# Patient Record
Sex: FEMALE | Race: WHITE | NOT HISPANIC OR LATINO | Employment: UNEMPLOYED | URBAN - METROPOLITAN AREA
[De-identification: names, ages, dates, MRNs, and addresses within clinical notes are randomized per-mention and may not be internally consistent; named-entity substitution may affect disease eponyms.]

---

## 2023-10-04 ENCOUNTER — TREATMENT (OUTPATIENT)
Dept: OCCUPATIONAL THERAPY | Facility: CLINIC | Age: 68
End: 2023-10-04
Payer: COMMERCIAL

## 2023-10-04 DIAGNOSIS — M72.0 DUPUYTRENS CONTRACTURE: ICD-10-CM

## 2023-10-04 PROCEDURE — 97110 THERAPEUTIC EXERCISES: CPT | Mod: GO

## 2023-10-04 PROCEDURE — 97010 HOT OR COLD PACKS THERAPY: CPT | Mod: GO

## 2023-10-04 ASSESSMENT — PAIN - FUNCTIONAL ASSESSMENT: PAIN_FUNCTIONAL_ASSESSMENT: 0-10

## 2023-10-04 ASSESSMENT — PAIN DESCRIPTION - DESCRIPTORS: DESCRIPTORS: ACHING;THROBBING

## 2023-10-04 ASSESSMENT — PAIN SCALES - GENERAL: PAINLEVEL_OUTOF10: 7

## 2023-10-04 NOTE — PROGRESS NOTES
"Occupational Therapy     Occupational Therapy Treatment     Patient Name: Zohreh Davis  MRN: [id number]  Today's Date: 10/4/2023  Time Calculation  Start Time: 1101 (visit 8 of 12)  Stop Time: 1156  Time Calculation (min): 55 min      Current Problem  1. Dupuytrens contracture  OT eval and treat           Subjective  \" I saw the doctor and he said everything looks good. To keep doing what I am doing.        Pain Assessment:  Pain Assessment  Pain Assessment: 0-10  Pain Score: 7  Pain Type: Chronic pain  Pain Location: Hand (L RF and MF)  Pain Orientation: Left  Pain Descriptors: Aching, Throbbing (Pt reports that RF is numb and tendon sticks after flexion)  Pain Frequency: Constant/continuous  Pain Interventions:  (Pt uses heat PRN)     Objective   No edema. Healed incision. Reviewed importance of scar massage to reduce sensitivity     Modalities:  AROM while in fluidotherapy x 10 min to promote movement during session   Ice applied at end of session x 5 min to reduce inflammation and edema  Splinting:  Wearing pre-fran wrist splint at night,.  Therapeutic Exercises:   Therapeutic Exercise  Therapeutic Exercise Performed: Yes  Therapeutic Exercise Activity 1: BTE initiated on this date, 2 full charts at the following resistances, grasp at 15#, 3 point pinch at 10#,large Jar lid horizontally at 4# CCW and 4# CW, jar lid laterally  for finger tips at 2# CW and 2# CCW    Grasp measured today at 60# R and 60#L  Pinch measured today  2 point R at 11.5#/ L at 11#  3 point R at 16#/ L at 14#  Lateral R at 15#/ L at 13#    Sensory   Reports numbness in RF of L hand. Educated pt in re-sensitization tech, returned demo  Outcome Measures : Grasp      OP EDUCATION  Re-sensitization, use of modalities to achieve max function     Follow up doctor visit: Discharged from Doctor, will call if there are any issues    Assessment/Plan   Pt to increase reps of HEP to 15(from 10) of theraputty , iso noodle and tendon glides to reduce " "\"sticking\".      End Pain rating 7/10 , mod fatigue      "

## 2023-10-08 PROBLEM — R06.02 SHORTNESS OF BREATH: Status: ACTIVE | Noted: 2023-10-08

## 2023-10-08 PROBLEM — S39.92XA INJURY OF BACK: Status: ACTIVE | Noted: 2022-08-04

## 2023-10-08 PROBLEM — S16.1XXA STRAIN OF NECK MUSCLE: Status: ACTIVE | Noted: 2022-08-04

## 2023-10-08 PROBLEM — M65.4 TENDINITIS, DE QUERVAIN'S: Status: ACTIVE | Noted: 2023-10-08

## 2023-10-08 PROBLEM — F33.1 MODERATE EPISODE OF RECURRENT MAJOR DEPRESSIVE DISORDER (MULTI): Status: ACTIVE | Noted: 2021-10-19

## 2023-10-08 PROBLEM — F31.62 BIPOLAR DISORDER, CURRENT EPISODE MIXED, MODERATE (MULTI): Status: ACTIVE | Noted: 2023-10-08

## 2023-10-08 PROBLEM — S09.90XA INJURY OF HEAD: Status: ACTIVE | Noted: 2022-08-04

## 2023-10-08 PROBLEM — S40.019A CONTUSION OF SHOULDER: Status: ACTIVE | Noted: 2022-08-04

## 2023-10-08 PROBLEM — R40.1 CLOUDED CONSCIOUSNESS: Status: ACTIVE | Noted: 2022-08-04

## 2023-10-08 PROBLEM — G54.2 CERVICAL NERVE ROOT IMPINGEMENT: Status: ACTIVE | Noted: 2023-10-08

## 2023-10-08 PROBLEM — F51.05 INSOMNIA DUE TO OTHER MENTAL DISORDER: Status: ACTIVE | Noted: 2023-10-08

## 2023-10-08 PROBLEM — I51.81 TAKOTSUBO CARDIOMYOPATHY: Status: ACTIVE | Noted: 2022-08-04

## 2023-10-08 PROBLEM — F99 INSOMNIA DUE TO OTHER MENTAL DISORDER: Status: ACTIVE | Noted: 2023-10-08

## 2023-10-08 PROBLEM — M65.30 TRIGGER FINGER OF LEFT HAND: Status: ACTIVE | Noted: 2023-10-08

## 2023-10-08 PROBLEM — F17.200 NICOTINE USE DISORDER: Status: ACTIVE | Noted: 2022-08-28

## 2023-10-08 PROBLEM — I10 ESSENTIAL HYPERTENSION: Status: ACTIVE | Noted: 2022-08-04

## 2023-10-08 PROBLEM — Z86.79 HISTORY OF HEART FAILURE: Status: ACTIVE | Noted: 2022-08-04

## 2023-10-08 PROBLEM — I50.22 CHRONIC SYSTOLIC (CONGESTIVE) HEART FAILURE (MULTI): Status: ACTIVE | Noted: 2020-11-05

## 2023-10-08 PROBLEM — E27.8 ADRENAL MASS (MULTI): Status: ACTIVE | Noted: 2022-08-04

## 2023-10-08 PROBLEM — F41.9 ANXIETY: Status: ACTIVE | Noted: 2022-08-04

## 2023-10-08 PROBLEM — F41.1 GENERALIZED ANXIETY DISORDER: Status: ACTIVE | Noted: 2023-10-08

## 2023-10-08 PROBLEM — M54.30 SCIATICA: Status: ACTIVE | Noted: 2023-10-08

## 2023-10-08 RX ORDER — PANTOPRAZOLE SODIUM 40 MG/1
40 TABLET, DELAYED RELEASE ORAL DAILY
COMMUNITY
Start: 2023-04-27

## 2023-10-08 RX ORDER — ONDANSETRON 4 MG/1
4 TABLET, ORALLY DISINTEGRATING ORAL EVERY 8 HOURS PRN
COMMUNITY
Start: 2023-09-25

## 2023-10-08 RX ORDER — TRAZODONE HYDROCHLORIDE 50 MG/1
TABLET ORAL NIGHTLY
COMMUNITY

## 2023-10-08 RX ORDER — CARVEDILOL 6.25 MG/1
6.25 TABLET ORAL
COMMUNITY

## 2023-10-08 RX ORDER — POLYETHYLENE GLYCOL-3350 AND ELECTROLYTES 236; 6.74; 5.86; 2.97; 22.74 G/274.31G; G/274.31G; G/274.31G; G/274.31G; G/274.31G
4000 POWDER, FOR SOLUTION ORAL ONCE
COMMUNITY
Start: 2023-02-07

## 2023-10-08 RX ORDER — TEMAZEPAM 15 MG/1
10 CAPSULE ORAL NIGHTLY PRN
COMMUNITY
Start: 2023-07-07

## 2023-10-08 RX ORDER — METHOCARBAMOL 750 MG/1
750 TABLET, FILM COATED ORAL EVERY 6 HOURS PRN
COMMUNITY
Start: 2022-10-24

## 2023-10-08 RX ORDER — IBUPROFEN 200 MG
600 TABLET ORAL AS NEEDED
COMMUNITY

## 2023-10-08 RX ORDER — OXYCODONE AND ACETAMINOPHEN 5; 325 MG/1; MG/1
1 TABLET ORAL EVERY 8 HOURS PRN
COMMUNITY

## 2023-10-08 RX ORDER — PEDI NUTRITION,IRON,LACT-FREE 0.03G-1/ML
1 LIQUID (ML) ORAL DAILY
COMMUNITY
Start: 2023-09-28 | End: 2023-10-28

## 2023-10-08 RX ORDER — METHYLPREDNISOLONE 4 MG/1
TABLET ORAL
COMMUNITY
Start: 2021-01-25

## 2023-10-08 RX ORDER — SODIUM CHLORIDE 0.9 % (FLUSH) 0.9 %
10 SYRINGE (ML) INJECTION
COMMUNITY
Start: 2022-08-03 | End: 2023-11-02

## 2023-10-08 RX ORDER — ERGOCALCIFEROL 1.25 MG/1
50000 CAPSULE ORAL 2 TIMES WEEKLY
COMMUNITY
Start: 2023-07-07

## 2023-10-08 RX ORDER — PREDNISONE 20 MG/1
20 TABLET ORAL DAILY
COMMUNITY
Start: 2022-10-27

## 2023-10-08 RX ORDER — FUROSEMIDE 40 MG/1
40 TABLET ORAL DAILY
COMMUNITY

## 2023-10-08 RX ORDER — HYDROXYZINE HYDROCHLORIDE 25 MG/1
25 TABLET, FILM COATED ORAL NIGHTLY
COMMUNITY
Start: 2023-07-06

## 2023-10-08 RX ORDER — HYDROCODONE BITARTRATE AND ACETAMINOPHEN 5; 325 MG/1; MG/1
1 TABLET ORAL EVERY 6 HOURS PRN
COMMUNITY
Start: 2023-07-31

## 2023-10-08 RX ORDER — FUROSEMIDE 80 MG/1
TABLET ORAL
COMMUNITY

## 2023-10-08 RX ORDER — CIPROFLOXACIN 500 MG/1
500 TABLET ORAL 2 TIMES DAILY
COMMUNITY
Start: 2022-12-01 | End: 2022-12-08

## 2023-10-08 RX ORDER — SACUBITRIL AND VALSARTAN 49; 51 MG/1; MG/1
1 TABLET, FILM COATED ORAL 2 TIMES DAILY
COMMUNITY
Start: 2020-10-07

## 2023-10-08 RX ORDER — CEPHALEXIN 500 MG/1
500 CAPSULE ORAL 3 TIMES DAILY
COMMUNITY
Start: 2022-10-14 | End: 2022-10-21

## 2023-10-08 RX ORDER — GABAPENTIN 400 MG/1
CAPSULE ORAL
COMMUNITY

## 2023-10-11 ENCOUNTER — TREATMENT (OUTPATIENT)
Dept: OCCUPATIONAL THERAPY | Facility: CLINIC | Age: 68
End: 2023-10-11
Payer: COMMERCIAL

## 2023-10-11 DIAGNOSIS — M72.0 DUPUYTRENS CONTRACTURE: ICD-10-CM

## 2023-10-11 PROCEDURE — 97022 WHIRLPOOL THERAPY: CPT | Mod: GO | Performed by: OCCUPATIONAL THERAPIST

## 2023-10-11 PROCEDURE — 97110 THERAPEUTIC EXERCISES: CPT | Mod: GO | Performed by: OCCUPATIONAL THERAPIST

## 2023-10-11 ASSESSMENT — PAIN SCALES - GENERAL: PAINLEVEL_OUTOF10: 7

## 2023-10-11 ASSESSMENT — PAIN - FUNCTIONAL ASSESSMENT: PAIN_FUNCTIONAL_ASSESSMENT: 0-10

## 2023-10-11 NOTE — PROGRESS NOTES
"Occupational Therapy Treatment    Patient Name: Zohreh Davis  MRN: [id number]  Today's Date: 10/11/2023  Time Calculation  Start Time: 1105  Stop Time: 1159  Time Calculation (min): 54 min  OT Modalities Time Entry  Whirlpool Time Entry: 10  OT Therapeutic Procedures Time Entry  Therapeutic Exercise Time Entry: 44    Subjective   Current Problem:  Patient is a 68-year-old female who presents s/p Dupuytren's Release with post-op splinting needs, wound care needs, pain, edema, abnormal sensation, impaired ROM, weakness, and impaired funtional use of L hand.     \"I am still having a lot of pain and it just doesn't seem right.\"     Pain:  Pain Assessment  Pain Assessment: 0-10  Pain Score: 7  Pain Type: Surgical pain  Pain Location: Hand  Pain Orientation: Left  Pain Descriptors:  (\"Clicking\"; \"locking\"; \"deep\")  Pain Frequency: Constant/continuous    Objective   General Visit Information: Patient is 10 weeks post-op; noted clicking and locking of L RF throughout tx sx.         Treatment:  Therapeutic Exercise  Therapeutic Exercise Activity 1: BTE machine; 2 charts each  Therapeutic Exercise Activity 2: Gripper (Resist 20)  Therapeutic Exercise Activity 3: Lg knob positioned laterally for FT's (Resist 3)  Therapeutic Exercise Activity 4: Lg knob CW/CCW (Resist 4)  Therapeutic Exercise Activity 5: MD SD FW/BW (Resist 4)    Fluidotherapy in conjunction with AROM ther ex L hand all planes to optimize joint mobility and comfort x10 minutes.    Scar massage throughout scar site with significant hypersensitivity and full-body flinching as therapist implemented.    PROM for L RF extension with focus on MP extension to neutral; moderate discomfort reported; prolonged hold at end range x10 seconds; 10 reps.    Assessment/Plan Patient with slowly improving functional use of L hand due to persist pain and hypersensitivity; good tolerance for ther ex this date; motivated to enhance use of surgical hand for return to all " pre-surgery activities and increase quality of life for the patient.           Goals Addressed    None

## 2023-10-19 ENCOUNTER — TREATMENT (OUTPATIENT)
Dept: OCCUPATIONAL THERAPY | Facility: CLINIC | Age: 68
End: 2023-10-19
Payer: COMMERCIAL

## 2023-10-19 DIAGNOSIS — M72.0 DUPUYTRENS CONTRACTURE: ICD-10-CM

## 2023-10-19 PROCEDURE — 97022 WHIRLPOOL THERAPY: CPT | Mod: GO,CO

## 2023-10-19 PROCEDURE — 97110 THERAPEUTIC EXERCISES: CPT | Mod: GO,CO

## 2023-10-19 ASSESSMENT — PAIN DESCRIPTION - DESCRIPTORS: DESCRIPTORS: THROBBING;OTHER (COMMENT)

## 2023-10-19 ASSESSMENT — PAIN - FUNCTIONAL ASSESSMENT: PAIN_FUNCTIONAL_ASSESSMENT: 0-10

## 2023-10-19 ASSESSMENT — PAIN SCALES - GENERAL: PAINLEVEL_OUTOF10: 7

## 2023-10-19 NOTE — PROGRESS NOTES
"Occupational Therapy    Occupational Therapy Treatment/ 10th visit progress note    Name: Zohreh Davis  MRN: [id number]  : 1955  Date: 10/19/23  Time Calculation  Start Time: 1352 (visir 10 of 12 approved)  Stop Time: 1502 (visit 10  12)  Time Calculation (min): 70 min  Today's date:10/19/23    Assessment:No edema. Pt continues to report L RF \"sticking\". Continues with contracture/stiffness noted in PIP and DIP joint of L RF. Pt using heat and ice several times a day.        Subjective OT Last Visit  OT Received On: 10/19/23   Pain Assessment: 710, unchanged since last session.   Pain Assessment  Pain Assessment: 0-10  Pain Score: 7  Pain Type: Surgical pain  Pain Location: Hand  Pain Orientation: Left  Pain Descriptors: Throbbing, Other (Comment) (clicking)  Pain Frequency: Constant/continuous  Pain Onset: Ongoing  Clinical Progression: Not changed  Pain Interventions: Heat applied, Cold applied    Objective      Current Problem  1. Dupuytrens contracture  Follow Up In Occupational Therapy           Modalities:  AROM while in fluidotherapy x 10 min to promote movement during session   Ice applied at end of session x 5 min to reduce inflammation and edema  Splinting:  Wearing pre-fran wrist splint at night,.  Therapeutic Exercises:   Therapeutic Exercise  Therapeutic Exercise Activity 1: BTE machine; 2 charts each  Therapeutic Exercise Activity 2: Gripper (Resist 30(up from 20))  Therapeutic Exercise Activity 3: Lg knob positioned laterally for FT's (Resist 3#(same))  Therapeutic Exercise Activity 4: Lg knob CW/CCW (Resist 5#(up from 4))    Grasp measured today at 64#(was 60)R and 62#L(was 60)  Pinch measured today  2 point R at 14#(was 11.5)/ L at 11#  3 point R at 18#(was 16)/ L at 14#  Lateral R at 18#(was 15)/ L at 15#(was 13)    AROM measured on this date   L LF -MP 0/75, PIP 0/80, DIP 0/75  L RF- MP -30/80, PIP 0/80, DIP 0/75  L SF- MP 0/80, PIP 0/75, DIP 0/70    Sensory   Reports numbness in RF " "of L hand. Educated pt in re-sensitization tech, returned demo  Outcome Measures : Grasp      OP EDUCATION  Re-sensitization, use of modalities to achieve max function     Follow up doctor visit: Discharged from Doctor, will call if there are any issues.     Assessment/Plan   Pt to increase reps of HEP to 20(from 15) of theraputty , iso noodle and tendon glides to reduce \"sticking\".      End Pain rating 7/10 , mod fatigue    Therapy/Activity: Therapeutic Exercise  Therapeutic Exercise Activity 1: BTE machine; 2 charts each  Therapeutic Exercise Activity 2: Gripper (Resist 30(up from 20))  Therapeutic Exercise Activity 3: Lg knob positioned laterally for FT's (Resist 3#(same))  Therapeutic Exercise Activity 4: Lg knob CW/CCW (Resist 5#(up from 4))  Outcome Measures:  UEFI re-assessed today at 45 /80(was 43/80 on 9/11/23)     Pt progress towards goals established at [facility] are as follows:  Pt will achieve L hand  strength within 65% of R hand for increased functional use of L hand-  GOAL MET  Pt will achieve L hand pinch strength all patterns within 1# of R hand for increased functional use of L hand- PT PROGRESSING - GOAL PARTIALLY MET  Pt will increase UEFI score to >65/80 to represent overall improved functional independence for common daily activities-PT PROGRESSING/GOAL PARTIALLY MET. UEFI score at 45/80.    OP EDUCATION:  Education  Individual(s) Educated: Patient  Education Provided: Ergonomics and postural realignment, Joint protection and energy conservation, Orthotics wearing schedule and precautions  Home Program: AROM, Modalities, Orthotic wearing schedule, care and precautions  Patient/Caregiver Demonstrated Understanding: yes  Plan of Care Discussed and Agreed Upon: yes  Patient Response to Education: Patient/Caregiver Verbalized Understanding of Information, Patient/Caregiver Performed Return Demonstration of Exercises/Activities, Patient/Caregiver Asked Appropriate Questions    Pt demonstrated " "increased  and pinch and increased ROM on this date. Pt increased UEFI score by 2 points. Continues to have limited ROM, pain has not changed and is now reporting her L RF \"sticking\" with flexion and ext. RECOMMEND  continued OT services to work towards established goals.                "

## 2023-10-25 ENCOUNTER — TREATMENT (OUTPATIENT)
Dept: OCCUPATIONAL THERAPY | Facility: CLINIC | Age: 68
End: 2023-10-25
Payer: COMMERCIAL

## 2023-10-25 DIAGNOSIS — M72.0 DUPUYTRENS CONTRACTURE: ICD-10-CM

## 2023-10-25 PROCEDURE — 97110 THERAPEUTIC EXERCISES: CPT | Mod: GO | Performed by: OCCUPATIONAL THERAPIST

## 2023-10-25 NOTE — PROGRESS NOTES
"Occupational Therapy Treatment    Patient Name: Zohreh Davis  MRN: [id number]  Today's Date: 10/25/2023    Time Calculation  Start Time: 1030  Stop Time: 1120  Time Calculation (min): 50 min  OT Modalities Time Entry  Whirlpool Time Entry: 10  OT Therapeutic Procedures Time Entry  Therapeutic Exercise Time Entry: 40    Insurance:  Visit number: 11  Insurance Type: Medicare    Subjective   Current Problem/Reason for visit:  Patient is a 68-year-old female who presents s/p Dupuytren's Release with post-op splinting needs, wound care needs, pain, edema, abnormal sensation, impaired ROM, weakness, and impaired funtional use of L hand.       Referred by: Dr. Mcdowell     Patient reports \"It's just not getting better. This finger (L RF) is clicking so bad now and it locks down. I have to unlock it with my other, just like before surgery.\"    Performing HEP?: Yes    Pain: 7/10     Objective   Patient is 12 weeks post-op at this time; noted subluxation of L RF MP joint.  Edema: N/a  Sensory: +Hypersensitivity throughout scar site    Treatment:    Modalities: Fluidotherapy in conjunction with AROM ther ex L hand all planes to optimize joint mobility and comfort x10 minutes.     Therapeutic Exercise: Scar massage throughout scar site with significant hypersensitivity   PROM for L RF extension with focus on MP extension to neutral; moderate discomfort reported; prolonged hold at end range x10 seconds; 10 reps.   Reviewed HEP; all questions/concerns answered/addressed; encouraged patient to limit HEP to 3x/day, as patient reports she has been performing HEP \"7, 8 or more times a day\".    Minor adjustments made to finger extension splint; removed LF component, padding added to promote MP extension, and new strapping applied. Patient verbalized much satisfaction with adjustments.    Post-tx pain: 7/10    Assessment/Plan Patient with worsening pain and \"clicking\" in L RF at this time; reports, \"The clicking is interfering " "with everything, styling my hair, journaling, just talking with my hands hurts.\" Will complete reassessment at next follow-up as last planned tx sx.     Goals Addressed    None       "

## 2023-11-01 ENCOUNTER — TREATMENT (OUTPATIENT)
Dept: OCCUPATIONAL THERAPY | Facility: CLINIC | Age: 68
End: 2023-11-01
Payer: COMMERCIAL

## 2023-11-01 DIAGNOSIS — M72.0 DUPUYTRENS CONTRACTURE: ICD-10-CM

## 2023-11-01 PROCEDURE — 97022 WHIRLPOOL THERAPY: CPT | Mod: GO | Performed by: OCCUPATIONAL THERAPIST

## 2023-11-01 PROCEDURE — 97110 THERAPEUTIC EXERCISES: CPT | Mod: GO | Performed by: OCCUPATIONAL THERAPIST

## 2023-11-01 NOTE — PROGRESS NOTES
Occupational Therapy Discharge [facility]    Patient Name: Zohreh Davis  MRN: [id number]  Today's Date: 11/1/2023    Subjective   Current Problem:  Patient is a 68-year-old female who presents s/p Dupuytren's Release with post-op splinting needs, wound care needs, pain, edema, abnormal sensation, impaired ROM, weakness, and impaired funtional use of L hand.        Pain: 7/10       Objective   Interventions: Ther ex, ther act, manual     ADL Assessment: UEFI= 60/80     Extremity Assessments:    Left hand:  AROM  INDEX LONG RING SMALL    MCP Extension/Flexion WFL 0/90 -25/90 0/90    PIP Extension/Flexion WFL 0/95 0/70 0/85    DIP Extension/Flexion WFL 0/75 0/65 0/85   ELLSWORTH   260  200 260       Hand Strength   (lbs) Right Left   1     2 60# 62#   3     4     5       Pinch Right Left   2-pt 11.5# 11.5#   3-pt 16# 12.5#   Lateral 15# 17.5#          Assessment/Plan Patient appears to have reached maximum benefit from skilled OT intervention at this time.           Goals:  Active         OT Goals         1. Patient will independently perform HEP.  --MET        Start:  09/28/23    Expected End:  09/28/23              2. Patient will tolerate/utilize splint as instructed with 1 adjustment. --MET        Start:  09/28/23    Expected End:  09/28/23              3. Patient will utilize LUE independently with ADL's with reports of 0/10 pain.  --PARTIALLY MET        Start:  09/28/23    Expected End:  10/25/23              4. Patient will achieve sufficient AROM/strength in LUE for greater ease of ADL's/IADL's. --MET        Start:  09/28/23    Expected End:  10/25/23              5. Patient will achieve 220 degrees of ELLSWORTH in L hand digits for greater ease of gripping with ADL's. --PARTIALLY MET        Start:  09/28/23    Expected End:  10/25/23              6. Patient will achieve L hand  strength within 65% of R hand for increased functional use of L hand.  --MET        Start:  09/28/23    Expected End:  10/25/23                     7. Patient will achieve L hand pinch stregnth all patterns within 1# of R hand for increased functional use of L hand.  --PARTIALLY MET        Start:  09/28/23    Expected End:  10/25/23                    8. Patient will increase UEFI score to >65/80 to represent overall improved functional independence for common daily activities. --PARTIALLY  MET        Start:  09/28/23    Expected End:  10/25/23

## 2023-11-01 NOTE — PROGRESS NOTES
"Occupational Therapy Treatment    Patient Name: Zohreh Davis  MRN: [id number]  Today's Date: 11/1/2023    Time Calculation  Start Time: 1025  Stop Time: 1105  Time Calculation (min): 40 min  OT Modalities Time Entry  Whirlpool Time Entry: 10  OT Therapeutic Procedures Time Entry  Therapeutic Exercise Time Entry: 30    Insurance:  Visit number: 12   Insurance Type: Medicare    Subjective   Current Problem/Reason for visit:  Patient is a 68-year-old female who presents s/p Dupuytren's Release with post-op splinting needs, wound care needs, pain, edema, abnormal sensation, impaired ROM, weakness, and impaired funtional use of L hand.        Referred by: Dr. Mcdowell    Patient reports \"I am manually unlocking my finger all day long, which is why I have the surgery to begin with. I don't want to have another one.\"    Performing HEP?: Yes    Pain: 7/10       Objective   Reassessment this date\"  UEFI= 60/80    Hand Range of Motion     Left hand:  AROM  INDEX LONG RING SMALL    MCP Extension/Flexion WFL 0/90 -25/90 0/90    PIP Extension/Flexion WFL 0/95 0/70 0/85    DIP Extension/Flexion WFL 0/75 0/65 0/85   ELLSWORTH   260  200 260       Hand Strength   (lbs) Right Left   1     2 60# 62#   3     4     5       Pinch Right Left   2-pt 11.5# 11.5#   3-pt 16# 12.5#   Lateral 15# 17.5#       Physical Observation: Noted catching in R RF; patient physically releasing digit throughout tx sx; noted cramping of hand throughout tx sx.  Edema: N/a  Sensory: +Hypersensitivity throughout scar site      Treatment:    Modalities: Fluidotherapy in conjunction with AROM ther ex L hand all planes to optimize joint mobility and comfort x10 minutes.     Therapeutic Exercise:  Reviewed HEP; all questions/concerns answered/addressed.   Implemented gentle joint mobilization to R RF at MP with anterior/posterior gliding and gentle traction in conjunction with PROM MP extension; good tolerance demonstrated; 10 reps PROM with prolonged hold at " end range  Therapist educated patient on purpose of/benefits of L RF MP extension splint to reduce MP flexion and reduce triggering of digit; provided patient with written information.  Therapist educated patient on purpose of/benefits of dry needling for pain management, scar management, and hypersensitivity of scar; written information provided; patient will consider and follow up in clinic as she sees fit.    Post-tx pain: 7/10    Assessment/Plan Patient appears to have reached maximum benefit from skilled OT intervention at this time; will proceed with discharge.      OP EDUCATION: Patient verbalized understanding for all education as noted above.       Goals:  Active       OT Goals       1. Patient will independently perform HEP.  --MET      Start:  09/28/23    Expected End:  09/28/23            2. Patient will tolerate/utilize splint as instructed with 1 adjustment. --MET      Start:  09/28/23    Expected End:  09/28/23            3. Patient will utilize LUE independently with ADL's with reports of 0/10 pain.  --PARTIALLY MET      Start:  09/28/23    Expected End:  10/25/23            4. Patient will achieve sufficient AROM/strength in LUE for greater ease of ADL's/IADL's. --MET      Start:  09/28/23    Expected End:  10/25/23            5. Patient will achieve 220 degrees of ELLSWORTH in L hand digits for greater ease of gripping with ADL's. --PARTIALLY MET      Start:  09/28/23    Expected End:  10/25/23            6. Patient will achieve L hand  strength within 65% of R hand for increased functional use of L hand.  --MET      Start:  09/28/23    Expected End:  10/25/23                7. Patient will achieve L hand pinch stregnth all patterns within 1# of R hand for increased functional use of L hand.  --PARTIALLY MET      Start:  09/28/23    Expected End:  10/25/23                8. Patient will increase UEFI score to >65/80 to represent overall improved functional independence for common daily activities.  --PARTIALLY  MET      Start:  09/28/23    Expected End:  10/25/23

## 2023-11-21 ENCOUNTER — DOCUMENTATION (OUTPATIENT)
Dept: PHYSICAL THERAPY | Facility: CLINIC | Age: 68
End: 2023-11-21
Payer: COMMERCIAL

## 2023-11-21 NOTE — PROGRESS NOTES
Physical Therapy                 Therapy Communication Note    Patient Name: Zohreh Davis  MRN: [id number]  Today's Date: 11/21/2023     Discipline: Physical Therapy    Missed Visit Reason:  was scheduled for FFS eval and dry needling, no show    Missed Time: No Show    Comment:

## 2024-03-16 ENCOUNTER — LAB (OUTPATIENT)
Dept: LAB | Facility: LAB | Age: 69
End: 2024-03-16
Payer: COMMERCIAL

## 2024-03-16 DIAGNOSIS — I10 ESSENTIAL (PRIMARY) HYPERTENSION: Primary | ICD-10-CM

## 2024-03-16 LAB
25(OH)D3 SERPL-MCNC: 14 NG/ML (ref 31–100)
ALBUMIN SERPL-MCNC: 4.5 G/DL (ref 3.5–5)
ALP BLD-CCNC: 83 U/L (ref 35–125)
ALT SERPL-CCNC: 11 U/L (ref 5–40)
ANION GAP SERPL CALC-SCNC: 14 MMOL/L
AST SERPL-CCNC: 14 U/L (ref 5–40)
BILIRUB SERPL-MCNC: <0.2 MG/DL (ref 0.1–1.2)
BUN SERPL-MCNC: 17 MG/DL (ref 8–25)
CALCIUM SERPL-MCNC: 9.9 MG/DL (ref 8.5–10.4)
CHLORIDE SERPL-SCNC: 102 MMOL/L (ref 97–107)
CHOLEST SERPL-MCNC: 228 MG/DL (ref 133–200)
CHOLEST/HDLC SERPL: 3 {RATIO}
CO2 SERPL-SCNC: 26 MMOL/L (ref 24–31)
CREAT SERPL-MCNC: 0.9 MG/DL (ref 0.4–1.6)
EGFRCR SERPLBLD CKD-EPI 2021: 69 ML/MIN/1.73M*2
ERYTHROCYTE [DISTWIDTH] IN BLOOD BY AUTOMATED COUNT: 14.7 % (ref 11.5–14.5)
EST. AVERAGE GLUCOSE BLD GHB EST-MCNC: 114 MG/DL
GLUCOSE SERPL-MCNC: 90 MG/DL (ref 65–99)
HBA1C MFR BLD: 5.6 %
HCT VFR BLD AUTO: 41.2 % (ref 36–46)
HDLC SERPL-MCNC: 75 MG/DL
HGB BLD-MCNC: 13.3 G/DL (ref 12–16)
LDLC SERPL CALC-MCNC: 134 MG/DL (ref 65–130)
MCH RBC QN AUTO: 30.9 PG (ref 26–34)
MCHC RBC AUTO-ENTMCNC: 32.3 G/DL (ref 32–36)
MCV RBC AUTO: 96 FL (ref 80–100)
NRBC BLD-RTO: 0 /100 WBCS (ref 0–0)
PLATELET # BLD AUTO: 261 X10*3/UL (ref 150–450)
POTASSIUM SERPL-SCNC: 4.6 MMOL/L (ref 3.4–5.1)
PROT SERPL-MCNC: 7.2 G/DL (ref 5.9–7.9)
RBC # BLD AUTO: 4.31 X10*6/UL (ref 4–5.2)
SODIUM SERPL-SCNC: 142 MMOL/L (ref 133–145)
TRIGL SERPL-MCNC: 96 MG/DL (ref 40–150)
TSH SERPL DL<=0.05 MIU/L-ACNC: 2.47 MIU/L (ref 0.27–4.2)
VIT B12 SERPL-MCNC: 628 PG/ML (ref 211–946)
WBC # BLD AUTO: 7.3 X10*3/UL (ref 4.4–11.3)

## 2024-03-16 PROCEDURE — 80061 LIPID PANEL: CPT

## 2024-03-16 PROCEDURE — 83036 HEMOGLOBIN GLYCOSYLATED A1C: CPT

## 2024-03-16 PROCEDURE — 80053 COMPREHEN METABOLIC PANEL: CPT

## 2024-03-16 PROCEDURE — 84443 ASSAY THYROID STIM HORMONE: CPT

## 2024-03-16 PROCEDURE — 82607 VITAMIN B-12: CPT

## 2024-03-16 PROCEDURE — 85027 COMPLETE CBC AUTOMATED: CPT

## 2024-03-16 PROCEDURE — 36415 COLL VENOUS BLD VENIPUNCTURE: CPT

## 2024-03-16 PROCEDURE — 82306 VITAMIN D 25 HYDROXY: CPT

## 2024-05-23 ENCOUNTER — HOSPITAL ENCOUNTER (OUTPATIENT)
Dept: RADIOLOGY | Facility: HOSPITAL | Age: 69
Discharge: HOME | End: 2024-05-23
Payer: COMMERCIAL

## 2024-05-23 ENCOUNTER — OFFICE VISIT (OUTPATIENT)
Dept: ORTHOPEDIC SURGERY | Facility: HOSPITAL | Age: 69
End: 2024-05-23
Payer: COMMERCIAL

## 2024-05-23 DIAGNOSIS — M19.019 ARTHRITIS OF SHOULDER: ICD-10-CM

## 2024-05-23 DIAGNOSIS — M25.511 RIGHT SHOULDER PAIN, UNSPECIFIED CHRONICITY: ICD-10-CM

## 2024-05-23 DIAGNOSIS — M25.819 SHOULDER IMPINGEMENT: Primary | ICD-10-CM

## 2024-05-23 PROCEDURE — 2500000005 HC RX 250 GENERAL PHARMACY W/O HCPCS: Performed by: ORTHOPAEDIC SURGERY

## 2024-05-23 PROCEDURE — 1125F AMNT PAIN NOTED PAIN PRSNT: CPT | Performed by: ORTHOPAEDIC SURGERY

## 2024-05-23 PROCEDURE — 1160F RVW MEDS BY RX/DR IN RCRD: CPT | Performed by: ORTHOPAEDIC SURGERY

## 2024-05-23 PROCEDURE — 1159F MED LIST DOCD IN RCRD: CPT | Performed by: ORTHOPAEDIC SURGERY

## 2024-05-23 PROCEDURE — 1036F TOBACCO NON-USER: CPT | Performed by: ORTHOPAEDIC SURGERY

## 2024-05-23 PROCEDURE — 2500000004 HC RX 250 GENERAL PHARMACY W/ HCPCS (ALT 636 FOR OP/ED): Performed by: ORTHOPAEDIC SURGERY

## 2024-05-23 PROCEDURE — 73030 X-RAY EXAM OF SHOULDER: CPT | Mod: RIGHT SIDE | Performed by: STUDENT IN AN ORGANIZED HEALTH CARE EDUCATION/TRAINING PROGRAM

## 2024-05-23 PROCEDURE — 99214 OFFICE O/P EST MOD 30 MIN: CPT | Performed by: ORTHOPAEDIC SURGERY

## 2024-05-23 PROCEDURE — 73030 X-RAY EXAM OF SHOULDER: CPT | Mod: RT

## 2024-05-23 PROCEDURE — 99204 OFFICE O/P NEW MOD 45 MIN: CPT | Performed by: ORTHOPAEDIC SURGERY

## 2024-05-23 PROCEDURE — 20611 DRAIN/INJ JOINT/BURSA W/US: CPT | Mod: RT | Performed by: ORTHOPAEDIC SURGERY

## 2024-05-23 RX ORDER — LIDOCAINE HYDROCHLORIDE 10 MG/ML
3 INJECTION INFILTRATION; PERINEURAL
Status: COMPLETED | OUTPATIENT
Start: 2024-05-23 | End: 2024-05-23

## 2024-05-23 RX ADMIN — LIDOCAINE HYDROCHLORIDE 3 ML: 10 INJECTION, SOLUTION INFILTRATION; PERINEURAL at 12:53

## 2024-05-23 RX ADMIN — TRIAMCINOLONE ACETONIDE 30 MG: 10 INJECTION, SUSPENSION INTRA-ARTICULAR; INTRALESIONAL at 12:53

## 2024-05-23 ASSESSMENT — ENCOUNTER SYMPTOMS
ARTHRALGIAS: 1
CHILLS: 0
NECK PAIN: 1
FEVER: 0
NECK STIFFNESS: 1
WHEEZING: 0
BACK PAIN: 1
JOINT SWELLING: 1
SHORTNESS OF BREATH: 0
FATIGUE: 0

## 2024-05-23 ASSESSMENT — PAIN SCALES - GENERAL: PAINLEVEL_OUTOF10: 6

## 2024-05-23 ASSESSMENT — PAIN - FUNCTIONAL ASSESSMENT: PAIN_FUNCTIONAL_ASSESSMENT: 0-10

## 2024-05-23 NOTE — PROGRESS NOTES
Reason for Appointment  Chief Complaint   Patient presents with    Right Shoulder - Pain     History of Present Illness  New patient is a 68 y.o. female here today for evaluation of right shoulder pain, interval history some years ago we did perform a AC joint reconstruction and that looks good on her plain x-rays today but she does have some early arthritic change no significant high riding humeral head but early osteophyte formation x-rays reviewed.  She has significant medical history from high blood pressure and heart issues.  Her pain is continuous classic bursitis type pain worse with overhead lifting better with rest wakes her up at night difficult to do overhead activities.  No recent falls, does take pain medicine.  Past medical history allergies medications social and family history are all reviewed and updated on her note today.    Past Medical History:   Diagnosis Date    Other conditions influencing health status     A Fall    Other conditions influencing health status     Urinary Tract Infection    Other conditions influencing health status     Thromboembolic Disease    Other conditions influencing health status     History Of ___ Previous Pregnancies    Personal history of other specified conditions     History of nausea    Personal history of other specified conditions     History of fatigue       Past Surgical History:   Procedure Laterality Date    CT GUIDED IMAGING FOR NEEDLE PLACEMENT  10/9/2020    CT GUIDED IMAGING FOR NEEDLE PLACEMENT [facility]    GALLBLADDER SURGERY  02/15/2013    Gallbladder Surgery    HYSTERECTOMY  02/15/2013    Hysterectomy    LUMBAR LAMINECTOMY  02/15/2013    Laminectomy Lumbar    MR HEAD ANGIO WO IV CONTRAST  3/3/2018    MR HEAD ANGIO WO IV CONTRAST [facility]    OTHER SURGICAL HISTORY  02/15/2013    Nephrectomy    OTHER SURGICAL HISTORY  10/03/2013    Nerve Block Transforaminal Epidural Lumbar    TOTAL KNEE ARTHROPLASTY  02/15/2013    Knee  Replacement       Medication Documentation Review Audit       Reviewed by Juan Baca MD (Physician) on 05/23/24 at 1202      Medication Order Taking? Sig Documenting Provider Last Dose Status   BACLOFEN, BULK, MISC [id number] Yes Insert 10 mg into the vagina once daily as needed. Dorina Reid MD Taking Active   buspirone HCl (BUSPAR ORAL) [id number] Yes BuSpar Dorina Reid MD Taking Active   carvedilol (Coreg) 6.25 mg tablet [id number] Yes Take 1 tablet (6.25 mg) by mouth 2 times daily (morning and late afternoon). Dorina Reid MD Taking Active   ergocalciferol (Vitamin D-2) 1.25 MG (63832 UT) capsule [id number] Yes Take 1 capsule (50,000 Units) by mouth 2 times a week. Dorina Reid MD Taking Active   furosemide (Lasix) 40 mg tablet [id number] Yes Take 1 tablet (40 mg) by mouth once daily. Dorina Reid MD Taking Active   furosemide (Lasix) 80 mg tablet [id number] Yes 1 tablet Orally Once a day IN THE AM AND ONE 40MG IN THE PM Dorina Reid MD Taking Active   gabapentin (Neurontin) 400 mg capsule [id number] Yes Take by mouth. Dorina Reid MD Taking Active   GaviLyte-G 236-22.74-6.74 -5.86 gram solution [id number] Yes Take 4,000 mL by mouth 1 time. Dorina Reid MD Taking Active   HYDROcodone-acetaminophen (Norco) 5-325 mg tablet [id number] Yes Take 1 tablet by mouth every 6 hours if needed for moderate pain (4 - 6) or severe pain (7 - 10). Dorina Reid MD Taking Active   hydrOXYzine HCL (Atarax) 25 mg tablet [id number] Yes Take 1 tablet (25 mg) by mouth once daily at bedtime. Dorina Reid MD Taking Active   ibuprofen 200 mg tablet [id number] Yes Take 3 tablets (600 mg) by mouth if needed. Dorina Reid MD Taking Active   LEVOFLOXACIN ORAL [id number] Yes Take by mouth. Dorina Reid MD Taking Active   methocarbamol (Robaxin) 750 mg tablet [id number] Yes Take 1 tablet (750 mg) by mouth every 6 hours if needed. Dorina Reid MD  Taking Active   methylPREDNISolone (Medrol Dospak) 4 mg tablets [id number] Yes Take by mouth. Daily for 6 days Historical Provider, MD Taking Active   ondansetron ODT (Zofran-ODT) 4 mg disintegrating tablet [id number] Yes Take 1 tablet (4 mg) by mouth every 8 hours if needed for nausea or vomiting. Historical Provider, MD Taking Active   oxyCODONE-acetaminophen (Percocet) 5-325 mg tablet [id number] Yes Take 1 tablet by mouth every 8 hours if needed. Historical Provider, MD Taking Active   pantoprazole (ProtoNix) 40 mg EC tablet [id number] Yes Take 1 tablet (40 mg) by mouth once daily. Historical Provider, MD Taking Active   predniSONE (Deltasone) 20 mg tablet [id number] Yes Take 1 tablet (20 mg) by mouth once daily. Historical Provider, MD Taking Active   sacubitriL-valsartan (Entresto) 49-51 mg tablet [id number] Yes Take 1 tablet by mouth 2 times a day. Historical Provider, MD Taking Active   temazepam (Restoril) 15 mg capsule [id number] Yes Take 10 mg by mouth as needed at bedtime for sleep. Historical Provider, MD Taking Active   traZODone (Desyrel) 50 mg tablet [id number] Yes Take by mouth once daily at bedtime. Historical Provider, MD Taking Active                    Allergies   Allergen Reactions    Ketorolac Anaphylaxis and Unknown     Toradol    Nsaids (Non-Steroidal Anti-Inflammatory Drug) Anaphylaxis, GI Upset and Unknown     Pt sts she can take ibuprofen but cannot take celebrex or aleve    Rofecoxib Nausea Only and Unknown     Vioxx    Flurbiprofen Unknown       Review of Systems   Constitutional:  Negative for chills, fatigue and fever.   Respiratory:  Negative for shortness of breath and wheezing.    Cardiovascular:  Negative for chest pain and leg swelling.   Musculoskeletal:  Positive for arthralgias, back pain, joint swelling, neck pain and neck stiffness.   Skin: Negative.        Exam   On examination the patient is alert awake in no acute distress oriented person place and time mood is good no JVD no  auditory wheezes neck shows some mild stiffness and some mild pain at the base of the neck.  No scapular winging right shoulder shows incision from previous AC joint surgery but she can get her arm up to about 130 degrees 170 degrees on the left good deltoid function mild weakness in external rotation but a markedly positive impingement sign good biceps triceps flexion extension strength good wrist flexion extension strength good intrinsic strength in the hand good pulses and sensation in the hand without any hyperreflexia negative Rita sign skin is thinned with some bruising with some arthritic changes in both hands but good range of motion she does have a right small finger flexion deformity that she had years past  Assessment   Encounter Diagnosis   Name Primary?    Right shoulder pain, unspecified chronicity    Bursitis shoulder with probable rotator cuff tear with early arthritic change    Plan   We went ahead and sterilely injected the right shoulder today in the subacromial space patient understands a small risk infection signs to look for.  Hopefully this will greatly help her long-term with her morbidities would like to avoid any surgical intervention and long-term looking at her arthroscopy and debridement most likely be a short-term fix and a reverse shoulder replacement could help her but only after all other avenues are explored.  We talked about this at length we will see how long the injection lasts.    L Inj/Asp: R subacromial bursa on 5/23/2024 12:53 PM  Indications: pain  Details: 22 G needle, ultrasound-guided  Medications: 3 mL lidocaine 10 mg/mL (1 %); 30 mg triamcinolone acetonide 10 mg/mL  Outcome: tolerated well, no immediate complications    After discussing the risks and benefits of the procedure with proceeded with an injection.  Using ultrasound guidance we identified the acromion, humeral head and the subacromial bursa, images obtained. We then sterilely injected the right  subacrominal space with a mixture of 30 mg of Kenalog and 2 cc of 1 % lidocaine. Pt tolerated the procedure well without any adverse reactions.   Procedure, treatment alternatives, risks and benefits explained, specific risks discussed. Consent was given by the patient. Immediately prior to procedure a time out was called to verify the correct patient, procedure, equipment, support staff and site/side marked as required. Patient was prepped and draped in the usual sterile fashion.

## 2024-08-22 ENCOUNTER — OFFICE VISIT (OUTPATIENT)
Dept: ORTHOPEDIC SURGERY | Facility: HOSPITAL | Age: 69
End: 2024-08-22
Payer: COMMERCIAL

## 2024-08-22 DIAGNOSIS — M75.41 SHOULDER IMPINGEMENT SYNDROME, RIGHT: Primary | ICD-10-CM

## 2024-08-22 DIAGNOSIS — G54.2 CERVICAL NERVE ROOT IMPINGEMENT: ICD-10-CM

## 2024-08-22 PROCEDURE — 99213 OFFICE O/P EST LOW 20 MIN: CPT | Performed by: ORTHOPAEDIC SURGERY

## 2024-08-22 PROCEDURE — 1159F MED LIST DOCD IN RCRD: CPT | Performed by: ORTHOPAEDIC SURGERY

## 2024-08-22 PROCEDURE — 2500000005 HC RX 250 GENERAL PHARMACY W/O HCPCS: Performed by: ORTHOPAEDIC SURGERY

## 2024-08-22 PROCEDURE — 20611 DRAIN/INJ JOINT/BURSA W/US: CPT | Mod: RT | Performed by: ORTHOPAEDIC SURGERY

## 2024-08-22 PROCEDURE — 1036F TOBACCO NON-USER: CPT | Performed by: ORTHOPAEDIC SURGERY

## 2024-08-22 PROCEDURE — 1125F AMNT PAIN NOTED PAIN PRSNT: CPT | Performed by: ORTHOPAEDIC SURGERY

## 2024-08-22 PROCEDURE — 2500000004 HC RX 250 GENERAL PHARMACY W/ HCPCS (ALT 636 FOR OP/ED): Performed by: ORTHOPAEDIC SURGERY

## 2024-08-22 PROCEDURE — 1160F RVW MEDS BY RX/DR IN RCRD: CPT | Performed by: ORTHOPAEDIC SURGERY

## 2024-08-22 RX ORDER — LIDOCAINE HYDROCHLORIDE 10 MG/ML
3 INJECTION INFILTRATION; PERINEURAL
Status: COMPLETED | OUTPATIENT
Start: 2024-08-22 | End: 2024-08-22

## 2024-08-22 ASSESSMENT — ENCOUNTER SYMPTOMS
ARTHRALGIAS: 1
SHORTNESS OF BREATH: 0
FEVER: 0
FATIGUE: 0
WHEEZING: 0
TROUBLE SWALLOWING: 0
CHILLS: 0
COLOR CHANGE: 0
SINUS PRESSURE: 0

## 2024-08-22 ASSESSMENT — PAIN SCALES - GENERAL: PAINLEVEL_OUTOF10: 8

## 2024-08-22 ASSESSMENT — PAIN - FUNCTIONAL ASSESSMENT: PAIN_FUNCTIONAL_ASSESSMENT: 0-10

## 2024-08-22 NOTE — PROGRESS NOTES
Reason for Appointment  Chief Complaint   Patient presents with    Right Shoulder - Pain     History of Present Illness  Patient is a 69 y.o. female here today for follow-up evaluation of of her right shoulder.  She has a history of a previous A/C joint reconstruction years ago for a severe clavicle fracture.  Previous x-rays taken do show some early arthritic change of the joint.  She does also have a significant cervical history of a fusion.  She had a previous subacromial injection a few months ago that did give her good relief, she has had recurrent lateral sided shoulder pain but some pain in the right trapezius as well.  No numbness or tingling down the arm.  Difficulty raising the arm overhead and sleeping at night.  No other changes in her past medical history, allergies, or medications.    Past Medical History:   Diagnosis Date    Other conditions influencing health status     A Fall    Other conditions influencing health status     Urinary Tract Infection    Other conditions influencing health status     Thromboembolic Disease    Other conditions influencing health status     History Of ___ Previous Pregnancies    Personal history of other specified conditions     History of nausea    Personal history of other specified conditions     History of fatigue       Past Surgical History:   Procedure Laterality Date    CT GUIDED IMAGING FOR NEEDLE PLACEMENT  10/9/2020    CT GUIDED IMAGING FOR NEEDLE PLACEMENT [facility]    GALLBLADDER SURGERY  02/15/2013    Gallbladder Surgery    HYSTERECTOMY  02/15/2013    Hysterectomy    LUMBAR LAMINECTOMY  02/15/2013    Laminectomy Lumbar    MR HEAD ANGIO WO IV CONTRAST  3/3/2018    MR HEAD ANGIO WO IV CONTRAST [facility]    OTHER SURGICAL HISTORY  02/15/2013    Nephrectomy    OTHER SURGICAL HISTORY  10/03/2013    Nerve Block Transforaminal Epidural Lumbar    TOTAL KNEE ARTHROPLASTY  02/15/2013    Knee Replacement       Medication Documentation Review Audit        Reviewed by Kristen Obrien PA-C (Physician Assistant) on 08/22/24 at 1448      Medication Order Taking? Sig Documenting Provider Last Dose Status   BACLOFEN, BULK, MISC [id number] Yes Insert 10 mg into the vagina once daily as needed. Historical MD Jeanette Taking Active   buspirone HCl (BUSPAR ORAL) [id number] Yes BuSpar Historical Provider, MD Taking Active   carvedilol (Coreg) 6.25 mg tablet [id number] Yes Take 1 tablet (6.25 mg) by mouth 2 times daily (morning and late afternoon). Historical Provider, MD Taking Active   ergocalciferol (Vitamin D-2) 1.25 MG (88901 UT) capsule [id number] Yes Take 1 capsule (50,000 Units) by mouth 2 times a week. Historical Provider, MD Taking Active   furosemide (Lasix) 40 mg tablet [id number] Yes Take 1 tablet (40 mg) by mouth once daily. Historical Provider, MD Taking Active   furosemide (Lasix) 80 mg tablet [id number] Yes 1 tablet Orally Once a day IN THE AM AND ONE 40MG IN THE PM Historical Provider, MD Taking Active   gabapentin (Neurontin) 400 mg capsule [id number] Yes Take by mouth. Historical Provider, MD Taking Active   GaviLyte-G 236-22.74-6.74 -5.86 gram solution [id number] Yes Take 4,000 mL by mouth 1 time. Historical Provider, MD Taking Active   HYDROcodone-acetaminophen (Norco) 5-325 mg tablet [id number] Yes Take 1 tablet by mouth every 6 hours if needed for moderate pain (4 - 6) or severe pain (7 - 10). Historical Provider, MD Taking Active   hydrOXYzine HCL (Atarax) 25 mg tablet [id number] Yes Take 1 tablet (25 mg) by mouth once daily at bedtime. Historical Provider, MD Taking Active   ibuprofen 200 mg tablet [id number] Yes Take 3 tablets (600 mg) by mouth if needed. Historical Provider, MD Taking Active   LEVOFLOXACIN ORAL [id number] Yes Take by mouth. Historical Provider, MD Taking Active   methocarbamol (Robaxin) 750 mg tablet [id number] Yes Take 1 tablet (750 mg) by mouth every 6 hours if needed. Historical Provider, MD Taking Active   methylPREDNISolone (Medrol  Dospak) 4 mg tablets [id number] Yes Take by mouth. Daily for 6 days Historical Provider, MD Taking Active   ondansetron ODT (Zofran-ODT) 4 mg disintegrating tablet [id number] Yes Take 1 tablet (4 mg) by mouth every 8 hours if needed for nausea or vomiting. Historical Provider, MD Taking Active   oxyCODONE-acetaminophen (Percocet) 5-325 mg tablet [id number] Yes Take 1 tablet by mouth every 8 hours if needed. Historical Provider, MD Taking Active   pantoprazole (ProtoNix) 40 mg EC tablet [id number] Yes Take 1 tablet (40 mg) by mouth once daily. Historical Provider, MD Taking Active   predniSONE (Deltasone) 20 mg tablet [id number] Yes Take 1 tablet (20 mg) by mouth once daily. Historical Provider, MD Taking Active   sacubitriL-valsartan (Entresto) 49-51 mg tablet [id number] Yes Take 1 tablet by mouth 2 times a day. Historical Provider, MD Taking Active   temazepam (Restoril) 15 mg capsule [id number] Yes Take 10 mg by mouth as needed at bedtime for sleep. Historical Provider, MD Taking Active   traZODone (Desyrel) 50 mg tablet [id number] Yes Take by mouth once daily at bedtime. Historical Provider, MD Taking Active                    Allergies   Allergen Reactions    Ketorolac Anaphylaxis and Unknown     Toradol    Nsaids (Non-Steroidal Anti-Inflammatory Drug) Anaphylaxis, GI Upset and Unknown     Pt sts she can take ibuprofen but cannot take celebrex or aleve    Rofecoxib Nausea Only and Unknown     Vioxx    Flurbiprofen Unknown       Review of Systems   Constitutional:  Negative for chills, fatigue and fever.   HENT:  Negative for nosebleeds, sinus pressure and trouble swallowing.    Respiratory:  Negative for shortness of breath and wheezing.    Cardiovascular:  Negative for chest pain and leg swelling.   Musculoskeletal:  Positive for arthralgias.   Skin:  Negative for color change and pallor.     Exam   On exam the right shoulder shows mild pain with active forward flexion up to about 130 degrees.  She has more pain with  bringing the arm down.  Fairly good cuff strength with resisted external rotation, some mild weakness compared to the opposite side.  Deltoid is functional.  Positive impingement signs on the right.  Tender over the right trapezius with baseline cervical stiffness    Assessment   Right shoulder impingement  Cervical impingement    Plan   She may be having a combination of cervical and shoulder symptoms.  We discussed a simple injection today to see how much relief she gets, she should follow-up with her neck surgeon with her trapezial and posterior shoulder pain.  We sterilely injected under ultrasound guidance Kenalog lidocaine into the right shoulder subacromial space.  Patient understands the small risk of infection and the signs to look for as well as flare reaction.  Hopefully this gives her good relief.  She can follow-up with us as needed.    L Inj/Asp: R subacromial bursa on 8/22/2024 3:01 PM  Indications: pain  Details: 22 G needle, ultrasound-guided  Medications: 3 mL lidocaine 10 mg/mL (1 %); 30 mg triamcinolone acetonide 10 mg/mL  Outcome: tolerated well, no immediate complications    After discussing the risks and benefits of the procedure with proceeded with an injection.  Using ultrasound guidance we identified the acromion, humeral head and the subacromial bursa, images obtained. We then sterilely injected the right subacrominal space with a mixture of 30 mg of Kenalog and 2 cc of 1 % lidocaine. Pt tolerated the procedure well without any adverse reactions.   Procedure, treatment alternatives, risks and benefits explained, specific risks discussed. Consent was given by the patient. Immediately prior to procedure a time out was called to verify the correct patient, procedure, equipment, support staff and site/side marked as required. Patient was prepped and draped in the usual sterile fashion.       Written by Kristen Baca saw, evaluated, and treated the patient with the  PA

## 2024-10-22 ENCOUNTER — APPOINTMENT (OUTPATIENT)
Dept: CARDIOLOGY | Facility: CLINIC | Age: 69
End: 2024-10-22
Payer: COMMERCIAL

## 2024-10-22 VITALS
HEART RATE: 93 BPM | BODY MASS INDEX: 31.71 KG/M2 | OXYGEN SATURATION: 98 % | TEMPERATURE: 98.6 F | SYSTOLIC BLOOD PRESSURE: 120 MMHG | WEIGHT: 179 LBS | HEIGHT: 63 IN | RESPIRATION RATE: 16 BRPM | DIASTOLIC BLOOD PRESSURE: 64 MMHG

## 2024-10-22 DIAGNOSIS — I10 ESSENTIAL HYPERTENSION: ICD-10-CM

## 2024-10-22 DIAGNOSIS — I51.81 TAKOTSUBO CARDIOMYOPATHY: ICD-10-CM

## 2024-10-22 DIAGNOSIS — I50.22 CHRONIC SYSTOLIC (CONGESTIVE) HEART FAILURE: ICD-10-CM

## 2024-10-22 DIAGNOSIS — I50.21 ACUTE SYSTOLIC HEART FAILURE: Primary | ICD-10-CM

## 2024-10-22 DIAGNOSIS — Z86.79 HISTORY OF HEART FAILURE: ICD-10-CM

## 2024-10-22 PROCEDURE — 1126F AMNT PAIN NOTED NONE PRSNT: CPT | Performed by: INTERNAL MEDICINE

## 2024-10-22 PROCEDURE — 3008F BODY MASS INDEX DOCD: CPT | Performed by: INTERNAL MEDICINE

## 2024-10-22 PROCEDURE — 3078F DIAST BP <80 MM HG: CPT | Performed by: INTERNAL MEDICINE

## 2024-10-22 PROCEDURE — 1159F MED LIST DOCD IN RCRD: CPT | Performed by: INTERNAL MEDICINE

## 2024-10-22 PROCEDURE — 3074F SYST BP LT 130 MM HG: CPT | Performed by: INTERNAL MEDICINE

## 2024-10-22 PROCEDURE — 1160F RVW MEDS BY RX/DR IN RCRD: CPT | Performed by: INTERNAL MEDICINE

## 2024-10-22 PROCEDURE — 99204 OFFICE O/P NEW MOD 45 MIN: CPT | Performed by: INTERNAL MEDICINE

## 2024-10-22 RX ORDER — CARVEDILOL 3.12 MG/1
3.12 TABLET ORAL
Qty: 180 TABLET | Refills: 3 | Status: SHIPPED | OUTPATIENT
Start: 2024-10-22 | End: 2025-10-22

## 2024-10-22 RX ORDER — LISINOPRIL 5 MG/1
5 TABLET ORAL DAILY
Qty: 30 TABLET | Refills: 11 | Status: SHIPPED | OUTPATIENT
Start: 2024-10-22 | End: 2025-10-22

## 2024-10-22 RX ORDER — FUROSEMIDE 40 MG/1
40 TABLET ORAL
Qty: 60 TABLET | Refills: 1 | Status: SHIPPED | OUTPATIENT
Start: 2024-10-22 | End: 2024-12-21

## 2024-10-22 ASSESSMENT — LIFESTYLE VARIABLES
HOW OFTEN DO YOU HAVE SIX OR MORE DRINKS ON ONE OCCASION: NEVER
HAS A RELATIVE, FRIEND, DOCTOR, OR ANOTHER HEALTH PROFESSIONAL EXPRESSED CONCERN ABOUT YOUR DRINKING OR SUGGESTED YOU CUT DOWN: NO
HOW MANY STANDARD DRINKS CONTAINING ALCOHOL DO YOU HAVE ON A TYPICAL DAY: PATIENT DOES NOT DRINK
AUDIT TOTAL SCORE: 0
SKIP TO QUESTIONS 9-10: 1
HOW OFTEN DO YOU HAVE A DRINK CONTAINING ALCOHOL: NEVER
HAVE YOU OR SOMEONE ELSE BEEN INJURED AS A RESULT OF YOUR DRINKING: NO
AUDIT-C TOTAL SCORE: 0

## 2024-10-22 ASSESSMENT — ENCOUNTER SYMPTOMS
LOSS OF SENSATION IN FEET: 0
DEPRESSION: 0
OCCASIONAL FEELINGS OF UNSTEADINESS: 0

## 2024-10-22 ASSESSMENT — PATIENT HEALTH QUESTIONNAIRE - PHQ9
1. LITTLE INTEREST OR PLEASURE IN DOING THINGS: NOT AT ALL
2. FEELING DOWN, DEPRESSED OR HOPELESS: NOT AT ALL
SUM OF ALL RESPONSES TO PHQ9 QUESTIONS 1 AND 2: 0

## 2024-10-22 ASSESSMENT — PAIN SCALES - GENERAL: PAINLEVEL_OUTOF10: 0-NO PAIN

## 2024-10-22 NOTE — PROGRESS NOTES
History of present illness:  69 year old female with history of nonischemic cardiomyopathy diagnosed in 2000.  Back then she had cardiac catheterization shortness (ejection fraction 20 to 25%.  Patient lost to follow-up and she has not been taking any of her medications for more than 4 years.  Presents to my office complaining of worsening shortness of breath orthopnea PND lower extremity edema.  Denies having any chest pain palpitations dizziness nausea vomiting.     Past Medical History:   Diagnosis Date    Other conditions influencing health status     A Fall    Other conditions influencing health status     Urinary Tract Infection    Other conditions influencing health status     Thromboembolic Disease    Other conditions influencing health status     History Of ___ Previous Pregnancies    Personal history of other specified conditions     History of nausea    Personal history of other specified conditions     History of fatigue       Past Surgical History:   Procedure Laterality Date    CT GUIDED IMAGING FOR NEEDLE PLACEMENT  10/9/2020    CT GUIDED IMAGING FOR NEEDLE PLACEMENT [facility]    GALLBLADDER SURGERY  02/15/2013    Gallbladder Surgery    HYSTERECTOMY  02/15/2013    Hysterectomy    LUMBAR LAMINECTOMY  02/15/2013    Laminectomy Lumbar    MR HEAD ANGIO WO IV CONTRAST  3/3/2018    MR HEAD ANGIO WO IV CONTRAST [facility]    OTHER SURGICAL HISTORY  02/15/2013    Nephrectomy    OTHER SURGICAL HISTORY  10/03/2013    Nerve Block Transforaminal Epidural Lumbar    TOTAL KNEE ARTHROPLASTY  02/15/2013    Knee Replacement       Allergies   Allergen Reactions    Ketorolac Anaphylaxis and Unknown     Toradol    Nsaids (Non-Steroidal Anti-Inflammatory Drug) Anaphylaxis, GI Upset and Unknown     Pt sts she can take ibuprofen but cannot take celebrex or aleve    Rofecoxib Nausea Only and Unknown     Vioxx    Flurbiprofen Unknown        reports that she has never smoked. She has never been exposed to  tobacco smoke. She has never used smokeless tobacco. She reports that she does not currently use alcohol. Drug use questions deferred to the physician.    No family history on file.    Patient's Medications   New Prescriptions    No medications on file   Previous Medications    BACLOFEN, BULK, MISC    Insert 10 mg into the vagina once daily as needed.    BUSPIRONE HCL (BUSPAR ORAL)    BuSpar    CARVEDILOL (COREG) 6.25 MG TABLET    Take 1 tablet (6.25 mg) by mouth 2 times daily (morning and late afternoon).    ERGOCALCIFEROL (VITAMIN D-2) 1.25 MG (44977 UT) CAPSULE    Take 1 capsule (50,000 Units) by mouth 2 times a week.    FUROSEMIDE (LASIX) 40 MG TABLET    Take 1 tablet (40 mg) by mouth once daily.    FUROSEMIDE (LASIX) 80 MG TABLET    1 tablet Orally Once a day IN THE AM AND ONE 40MG IN THE PM    GABAPENTIN (NEURONTIN) 400 MG CAPSULE    Take by mouth.    GAVILYTE-G 236-22.74-6.74 -5.86 GRAM SOLUTION    Take 4,000 mL by mouth 1 time.    HYDROCODONE-ACETAMINOPHEN (NORCO) 5-325 MG TABLET    Take 1 tablet by mouth every 6 hours if needed for moderate pain (4 - 6) or severe pain (7 - 10).    HYDROXYZINE HCL (ATARAX) 25 MG TABLET    Take 1 tablet (25 mg) by mouth once daily at bedtime.    IBUPROFEN 200 MG TABLET    Take 3 tablets (600 mg) by mouth if needed.    LEVOFLOXACIN ORAL    Take by mouth.    METHOCARBAMOL (ROBAXIN) 750 MG TABLET    Take 1 tablet (750 mg) by mouth every 6 hours if needed.    METHYLPREDNISOLONE (MEDROL DOSPAK) 4 MG TABLETS    Take by mouth. Daily for 6 days    ONDANSETRON ODT (ZOFRAN-ODT) 4 MG DISINTEGRATING TABLET    Take 1 tablet (4 mg) by mouth every 8 hours if needed for nausea or vomiting.    OXYCODONE-ACETAMINOPHEN (PERCOCET) 5-325 MG TABLET    Take 1 tablet by mouth every 8 hours if needed.    PANTOPRAZOLE (PROTONIX) 40 MG EC TABLET    Take 1 tablet (40 mg) by mouth once daily.    PREDNISONE (DELTASONE) 20 MG TABLET    Take 1 tablet (20 mg) by mouth once daily.    SACUBITRIL-VALSARTAN  (ENTRESTO) 49-51 MG TABLET    Take 1 tablet by mouth 2 times a day.    TEMAZEPAM (RESTORIL) 15 MG CAPSULE    Take 10 mg by mouth as needed at bedtime for sleep.    TRAZODONE (DESYREL) 50 MG TABLET    Take by mouth once daily at bedtime.   Modified Medications    No medications on file   Discontinued Medications    No medications on file     Review of systems.  10 point review of systems otherwise negative    Objective   Physical Exam  General: Patient in no acute distress   HEENT: Atraumatic normocephalic.  Neck: Supple, jugular venous pressure within normal limit.  No bruits  Lungs: Clear to auscultation bilaterally  Cardiovascular: Regular rate and rhythm, normal heart sounds, no murmurs rubs or gallops  Abdomen: Soft nontender nondistended.  Normal bowel sounds.  Extremities: Warm to touch, no edema.    Lab Review   No visits with results within 2 Month(s) from this visit.   Latest known visit with results is:   Lab on 03/16/2024   Component Date Value    WBC 03/16/2024 7.3     nRBC 03/16/2024 0.0     RBC 03/16/2024 4.31     Hemoglobin 03/16/2024 13.3     Hematocrit 03/16/2024 41.2     MCV 03/16/2024 96     MCH 03/16/2024 30.9     MCHC 03/16/2024 32.3     RDW 03/16/2024 14.7 (H)     Platelets 03/16/2024 261     Hemoglobin A1C 03/16/2024 5.6     Estimated Average Glucose 03/16/2024 114     Cholesterol 03/16/2024 228 (H)     HDL-Cholesterol 03/16/2024 75.0     Cholesterol/HDL Ratio 03/16/2024 3.0     LDL Calculated 03/16/2024 134 (H)     Triglycerides 03/16/2024 96     Vitamin D, 25-Hydroxy, T* 03/16/2024 14 (L)     Vitamin B12 03/16/2024 628     Thyroid Stimulating Horm* 03/16/2024 2.47     Glucose 03/16/2024 90     Sodium 03/16/2024 142     Potassium 03/16/2024 4.6     Chloride 03/16/2024 102     Bicarbonate 03/16/2024 26     Urea Nitrogen 03/16/2024 17     Creatinine 03/16/2024 0.90     eGFR 03/16/2024 69     Calcium 03/16/2024 9.9     Albumin 03/16/2024 4.5     Alkaline Phosphatase 03/16/2024 83     Total  Protein 03/16/2024 7.2     AST 03/16/2024 14     Bilirubin, Total 03/16/2024 <0.2     ALT 03/16/2024 11     Anion Gap 03/16/2024 14         Assessment/Plan   Patient Active Problem List   Diagnosis    Adrenal mass (Multi)    Anxiety    Bipolar disorder, current episode mixed, moderate (Multi)    Calculus of kidney    Cervical nerve root impingement    Chronic systolic (congestive) heart failure    Clouded consciousness    Essential hypertension    Generalized anxiety disorder    History of heart failure    Hypopotassemia    Injury of back    Injury of head    Insomnia due to other mental disorder    Left carpal tunnel syndrome    Leucocytosis    Moderate episode of recurrent major depressive disorder    Nicotine use disorder    Postlaminectomy syndrome, lumbar region    Prediabetes    Retention of urine, unspecified    Sciatica    Shortness of breath    Contusion of shoulder    Lumbar stenosis    Spondylosis of lumbar spine    Strain of neck muscle    Takotsubo cardiomyopathy    Tendinitis, de Quervain's    Trigger finger of left hand    Vitamin D deficiency        69 year old female with history of nonischemic cardiomyopathy diagnosed in 2000.  Back then she had cardiac catheterization shortness (ejection fraction 20 to 25%.  Patient lost to follow-up and she has not been taking any of her medications for more than 4 years.  Presents to my office complaining of worsening shortness of breath orthopnea PND lower extremity edema.  Denies having any chest pain palpitations dizziness nausea vomiting.  Patient was started recently on furosemide by her primary care physician and she has been partially responding to that.  Blood pressure and heart rate well-controlled.  For now I will start on carvedilol 3.125 mg oral twice daily, lisinopril 5 mg oral daily, increase her furosemide to 80 mg oral daily.  Will follow-up again in 4 weeks.  Will check labs in 2 weeks.  Will obtain an echocardiogram to assess her ejection  fraction at this point.  Had lengthy discussion with her about education for heart failure.  Will follow-up in 4 to 6 weeks.    Hayden Ramirez MD

## 2024-10-22 NOTE — PATIENT INSTRUCTIONS
Start taking carvedilol 3.125 mg twice daily.  Start taking lisinopril 5 mg oral daily.  Increase your furosemide to 80 mg in a.m. for now until you lose 5 to 10 pounds and then you go back to 40 once daily.  Have a blood work done in 2 weeks to make sure your kidneys are stable.  I will see you in 4 to 6 weeks

## 2024-11-12 ENCOUNTER — TELEPHONE (OUTPATIENT)
Dept: CARDIOLOGY | Facility: CLINIC | Age: 69
End: 2024-11-12
Payer: COMMERCIAL

## 2024-11-12 NOTE — TELEPHONE ENCOUNTER
carvedilol (Coreg) 3.125 mg tablet patient stating this is making her deathly sick,  she is not taking this anymore, did you want to start her on something else, or wait until she sees you on the 11/19/24

## 2024-11-19 ENCOUNTER — APPOINTMENT (OUTPATIENT)
Dept: CARDIOLOGY | Facility: CLINIC | Age: 69
End: 2024-11-19
Payer: COMMERCIAL

## 2024-11-19 VITALS
DIASTOLIC BLOOD PRESSURE: 58 MMHG | SYSTOLIC BLOOD PRESSURE: 128 MMHG | HEART RATE: 63 BPM | OXYGEN SATURATION: 97 % | RESPIRATION RATE: 16 BRPM | BODY MASS INDEX: 31.54 KG/M2 | WEIGHT: 178 LBS | HEIGHT: 63 IN | TEMPERATURE: 98.6 F

## 2024-11-19 DIAGNOSIS — I50.22 CHRONIC SYSTOLIC (CONGESTIVE) HEART FAILURE: Primary | ICD-10-CM

## 2024-11-19 DIAGNOSIS — I51.81 TAKOTSUBO CARDIOMYOPATHY: ICD-10-CM

## 2024-11-19 DIAGNOSIS — Z86.79 HISTORY OF HEART FAILURE: ICD-10-CM

## 2024-11-19 DIAGNOSIS — I10 ESSENTIAL HYPERTENSION: ICD-10-CM

## 2024-11-19 PROCEDURE — 3078F DIAST BP <80 MM HG: CPT | Performed by: INTERNAL MEDICINE

## 2024-11-19 PROCEDURE — 1126F AMNT PAIN NOTED NONE PRSNT: CPT | Performed by: INTERNAL MEDICINE

## 2024-11-19 PROCEDURE — 1159F MED LIST DOCD IN RCRD: CPT | Performed by: INTERNAL MEDICINE

## 2024-11-19 PROCEDURE — 3008F BODY MASS INDEX DOCD: CPT | Performed by: INTERNAL MEDICINE

## 2024-11-19 PROCEDURE — 3074F SYST BP LT 130 MM HG: CPT | Performed by: INTERNAL MEDICINE

## 2024-11-19 PROCEDURE — 1160F RVW MEDS BY RX/DR IN RCRD: CPT | Performed by: INTERNAL MEDICINE

## 2024-11-19 PROCEDURE — 99215 OFFICE O/P EST HI 40 MIN: CPT | Performed by: INTERNAL MEDICINE

## 2024-11-19 RX ORDER — BISOPROLOL FUMARATE 5 MG/1
2.5 TABLET, FILM COATED ORAL DAILY
Qty: 45 TABLET | Refills: 3 | Status: SHIPPED | OUTPATIENT
Start: 2024-11-19 | End: 2025-11-19

## 2024-11-19 ASSESSMENT — LIFESTYLE VARIABLES
HOW OFTEN DO YOU HAVE A DRINK CONTAINING ALCOHOL: NEVER
SKIP TO QUESTIONS 9-10: 1
AUDIT TOTAL SCORE: 0
HOW OFTEN DO YOU HAVE SIX OR MORE DRINKS ON ONE OCCASION: NEVER
HAVE YOU OR SOMEONE ELSE BEEN INJURED AS A RESULT OF YOUR DRINKING: NO
HOW MANY STANDARD DRINKS CONTAINING ALCOHOL DO YOU HAVE ON A TYPICAL DAY: PATIENT DOES NOT DRINK
HAS A RELATIVE, FRIEND, DOCTOR, OR ANOTHER HEALTH PROFESSIONAL EXPRESSED CONCERN ABOUT YOUR DRINKING OR SUGGESTED YOU CUT DOWN: NO
AUDIT-C TOTAL SCORE: 0

## 2024-11-19 ASSESSMENT — ENCOUNTER SYMPTOMS
LOSS OF SENSATION IN FEET: 0
OCCASIONAL FEELINGS OF UNSTEADINESS: 0
DEPRESSION: 0

## 2024-11-19 ASSESSMENT — PAIN SCALES - GENERAL: PAINLEVEL_OUTOF10: 0-NO PAIN

## 2024-11-19 NOTE — PATIENT INSTRUCTIONS
Heart bisoprolol 5 mg half tablet daily.  Continue lisinopril 5 mg daily and furosemide 80 mg once a day.  Have a blood work done and I will see you in 3 to 4 months

## 2024-11-19 NOTE — PROGRESS NOTES
History of present illness:  69 year old female with history of nonischemic cardiomyopathy diagnosed in 2000.  Back then she had cardiac catheterization shortness (ejection fraction 20 to 25%.  Patient lost to follow-up and she has not been taking any of her medications for more than 4 years.  Return to my office 4 weeks ago and we started her on lisinopril and carvedilol and addition of increasing her furosemide to 80 mg daily since she was very short of breath and edematous.  Patient did not tolerate carvedilol but she has been taking her lisinopril and furosemide.  Repeated echocardiogram done at [facility] showed ejection fraction of 55%.  Patient complains of fatigue but no shortness of breath orthopnea PND.  Lower extremity edema has improved.    Past Medical History:   Diagnosis Date    Other conditions influencing health status     A Fall    Other conditions influencing health status     Urinary Tract Infection    Other conditions influencing health status     Thromboembolic Disease    Other conditions influencing health status     History Of ___ Previous Pregnancies    Personal history of other specified conditions     History of nausea    Personal history of other specified conditions     History of fatigue       Past Surgical History:   Procedure Laterality Date    CT GUIDED IMAGING FOR NEEDLE PLACEMENT  10/9/2020    CT GUIDED IMAGING FOR NEEDLE PLACEMENT [facility]    GALLBLADDER SURGERY  02/15/2013    Gallbladder Surgery    HYSTERECTOMY  02/15/2013    Hysterectomy    LUMBAR LAMINECTOMY  02/15/2013    Laminectomy Lumbar    MR HEAD ANGIO WO IV CONTRAST  3/3/2018    MR HEAD ANGIO WO IV CONTRAST [facility]    OTHER SURGICAL HISTORY  02/15/2013    Nephrectomy    OTHER SURGICAL HISTORY  10/03/2013    Nerve Block Transforaminal Epidural Lumbar    TOTAL KNEE ARTHROPLASTY  02/15/2013    Knee Replacement       Allergies   Allergen Reactions    Ketorolac Anaphylaxis and Unknown     Toradol     Nsaids (Non-Steroidal Anti-Inflammatory Drug) Anaphylaxis, GI Upset and Unknown     Pt sts she can take ibuprofen but cannot take celebrex or aleve    Rofecoxib Nausea Only and Unknown     Vioxx    Flurbiprofen Unknown        reports that she has never smoked. She has never been exposed to tobacco smoke. She has never used smokeless tobacco. She reports that she does not currently use alcohol. Drug use questions deferred to the physician.    No family history on file.    Patient's Medications   New Prescriptions    No medications on file   Previous Medications    BACLOFEN, BULK, MISC    Insert 10 mg into the vagina once daily as needed.    BUSPIRONE HCL (BUSPAR ORAL)        CARVEDILOL (COREG) 3.125 MG TABLET    Take 1 tablet (3.125 mg) by mouth 2 times daily (morning and late afternoon).    ERGOCALCIFEROL (VITAMIN D-2) 1.25 MG (21244 UT) CAPSULE    Take 1 capsule (50,000 Units) by mouth 2 times a week.    FUROSEMIDE (LASIX) 40 MG TABLET    Take 1 tablet (40 mg) by mouth 2 times daily (morning and late afternoon).    GABAPENTIN (NEURONTIN) 400 MG CAPSULE    Take by mouth.    GAVILYTE-G 236-22.74-6.74 -5.86 GRAM SOLUTION    Take 4,000 mL by mouth 1 time.    HYDROCODONE-ACETAMINOPHEN (NORCO) 5-325 MG TABLET    Take 1 tablet by mouth every 6 hours if needed for moderate pain (4 - 6) or severe pain (7 - 10).    HYDROXYZINE HCL (ATARAX) 25 MG TABLET    Take 1 tablet (25 mg) by mouth once daily at bedtime.    IBUPROFEN 200 MG TABLET    Take 3 tablets (600 mg) by mouth if needed.    LEVOFLOXACIN ORAL    Take by mouth.    LISINOPRIL 5 MG TABLET    Take 1 tablet (5 mg) by mouth once daily.    METHOCARBAMOL (ROBAXIN) 750 MG TABLET    Take 1 tablet (750 mg) by mouth every 6 hours if needed.    METHYLPREDNISOLONE (MEDROL DOSPAK) 4 MG TABLETS    Take by mouth. Daily for 6 days    ONDANSETRON ODT (ZOFRAN-ODT) 4 MG DISINTEGRATING TABLET    Take 1 tablet (4 mg) by mouth every 8 hours if needed for nausea or vomiting.     OXYCODONE-ACETAMINOPHEN (PERCOCET) 5-325 MG TABLET    Take 1 tablet by mouth every 8 hours if needed.    PANTOPRAZOLE (PROTONIX) 40 MG EC TABLET    Take 1 tablet (40 mg) by mouth once daily.    PREDNISONE (DELTASONE) 20 MG TABLET    Take 1 tablet (20 mg) by mouth once daily.    SACUBITRIL-VALSARTAN (ENTRESTO) 49-51 MG TABLET    Take 1 tablet by mouth 2 times a day.    TEMAZEPAM (RESTORIL) 15 MG CAPSULE    Take 10 mg by mouth as needed at bedtime for sleep.    TRAZODONE (DESYREL) 50 MG TABLET    Take by mouth once daily at bedtime.   Modified Medications    No medications on file   Discontinued Medications    No medications on file       Objective   Physical Exam  General: Patient in no acute distress   HEENT: Atraumatic normocephalic.  Neck: Supple, jugular venous pressure within normal limit.  No bruits  Lungs: Clear to auscultation bilaterally  Cardiovascular: Regular rate and rhythm, normal heart sounds, no murmurs rubs or gallops  Abdomen: Soft nontender nondistended.  Normal bowel sounds.  Extremities: Warm to touch, no edema.    Lab Review   No visits with results within 2 Month(s) from this visit.   Latest known visit with results is:   Lab on 03/16/2024   Component Date Value    WBC 03/16/2024 7.3     nRBC 03/16/2024 0.0     RBC 03/16/2024 4.31     Hemoglobin 03/16/2024 13.3     Hematocrit 03/16/2024 41.2     MCV 03/16/2024 96     MCH 03/16/2024 30.9     MCHC 03/16/2024 32.3     RDW 03/16/2024 14.7 (H)     Platelets 03/16/2024 261     Hemoglobin A1C 03/16/2024 5.6     Estimated Average Glucose 03/16/2024 114     Cholesterol 03/16/2024 228 (H)     HDL-Cholesterol 03/16/2024 75.0     Cholesterol/HDL Ratio 03/16/2024 3.0     LDL Calculated 03/16/2024 134 (H)     Triglycerides 03/16/2024 96     Vitamin D, 25-Hydroxy, T* 03/16/2024 14 (L)     Vitamin B12 03/16/2024 628     Thyroid Stimulating Horm* 03/16/2024 2.47     Glucose 03/16/2024 90     Sodium 03/16/2024 142     Potassium 03/16/2024 4.6     Chloride  03/16/2024 102     Bicarbonate 03/16/2024 26     Urea Nitrogen 03/16/2024 17     Creatinine 03/16/2024 0.90     eGFR 03/16/2024 69     Calcium 03/16/2024 9.9     Albumin 03/16/2024 4.5     Alkaline Phosphatase 03/16/2024 83     Total Protein 03/16/2024 7.2     AST 03/16/2024 14     Bilirubin, Total 03/16/2024 <0.2     ALT 03/16/2024 11     Anion Gap 03/16/2024 14         Assessment/Plan   Patient Active Problem List   Diagnosis    Adrenal mass (Multi)    Anxiety    Bipolar disorder, current episode mixed, moderate (Multi)    Calculus of kidney    Cervical nerve root impingement    Chronic systolic (congestive) heart failure    Clouded consciousness    Essential hypertension    Generalized anxiety disorder    History of heart failure    Hypopotassemia    Injury of back    Injury of head    Insomnia due to other mental disorder    Left carpal tunnel syndrome    Leucocytosis    Moderate episode of recurrent major depressive disorder    Nicotine use disorder    Postlaminectomy syndrome, lumbar region    Prediabetes    Retention of urine, unspecified    Sciatica    Shortness of breath    Contusion of shoulder    Lumbar stenosis    Spondylosis of lumbar spine    Strain of neck muscle    Takotsubo cardiomyopathy    Tendinitis, de Quervain's    Trigger finger of left hand    Vitamin D deficiency      69 year old female with history of nonischemic cardiomyopathy diagnosed in 2000.  Back then she had cardiac catheterization shortness (ejection fraction 20 to 25%.  Patient lost to follow-up and she has not been taking any of her medications for more than 4 years.  Return to my office 4 weeks ago and we started her on lisinopril and carvedilol and addition of increasing her furosemide to 80 mg daily since she was very short of breath and edematous.  Patient did not tolerate carvedilol but she has been taking her lisinopril and furosemide.  Repeated echocardiogram done at [facility] showed ejection fraction of 55%.  Patient  complains of fatigue but no shortness of breath orthopnea PND.  Lower extremity edema has improved.  Denies having chest pain palpitations dizziness lightheadedness or syncope.    At this point my recommendation is to continue lisinopril and furosemide.  Will switch her to bisoprolol 2.5 mg oral daily to see if that caused her less side effects and it would be important since she had a previous history of cardiomyopathy in the past.  Will obtain a full battery of blood work including CMP TSH CBC with differential and BMP.  If her labs are okay and she is still feeling tired she needs to follow-up with her primary care physician for workup for fatigue.        Hayden Ramirez MD

## 2025-02-17 ENCOUNTER — APPOINTMENT (OUTPATIENT)
Dept: CARDIOLOGY | Facility: CLINIC | Age: 70
End: 2025-02-17
Payer: COMMERCIAL

## 2025-03-11 ENCOUNTER — TELEPHONE (OUTPATIENT)
Age: 70
End: 2025-03-11
Payer: COMMERCIAL

## 2025-03-17 ENCOUNTER — APPOINTMENT (OUTPATIENT)
Dept: CARDIOLOGY | Facility: CLINIC | Age: 70
End: 2025-03-17
Payer: COMMERCIAL

## 2025-06-02 ENCOUNTER — APPOINTMENT (OUTPATIENT)
Dept: CARDIOLOGY | Facility: CLINIC | Age: 70
End: 2025-06-02
Payer: COMMERCIAL

## 2025-06-09 ENCOUNTER — APPOINTMENT (OUTPATIENT)
Dept: ORTHOPEDIC SURGERY | Facility: CLINIC | Age: 70
End: 2025-06-09
Payer: COMMERCIAL

## 2025-06-09 DIAGNOSIS — M19.011 ARTHRITIS OF RIGHT SHOULDER: Primary | ICD-10-CM

## 2025-06-09 PROCEDURE — 1160F RVW MEDS BY RX/DR IN RCRD: CPT | Performed by: ORTHOPAEDIC SURGERY

## 2025-06-09 PROCEDURE — 1036F TOBACCO NON-USER: CPT | Performed by: ORTHOPAEDIC SURGERY

## 2025-06-09 PROCEDURE — 1125F AMNT PAIN NOTED PAIN PRSNT: CPT | Performed by: ORTHOPAEDIC SURGERY

## 2025-06-09 PROCEDURE — G8433 SCR FOR DEP NOT CPT DOC RSN: HCPCS | Performed by: ORTHOPAEDIC SURGERY

## 2025-06-09 PROCEDURE — 1159F MED LIST DOCD IN RCRD: CPT | Performed by: ORTHOPAEDIC SURGERY

## 2025-06-09 PROCEDURE — 20611 DRAIN/INJ JOINT/BURSA W/US: CPT | Performed by: ORTHOPAEDIC SURGERY

## 2025-06-09 PROCEDURE — 99213 OFFICE O/P EST LOW 20 MIN: CPT | Performed by: ORTHOPAEDIC SURGERY

## 2025-06-09 RX ORDER — TRIAMCINOLONE ACETONIDE 40 MG/ML
40 INJECTION, SUSPENSION INTRA-ARTICULAR; INTRAMUSCULAR
Status: COMPLETED | OUTPATIENT
Start: 2025-06-09 | End: 2025-06-09

## 2025-06-09 RX ORDER — LIDOCAINE HYDROCHLORIDE 10 MG/ML
3 INJECTION, SOLUTION INFILTRATION; PERINEURAL
Status: COMPLETED | OUTPATIENT
Start: 2025-06-09 | End: 2025-06-09

## 2025-06-09 RX ADMIN — LIDOCAINE HYDROCHLORIDE 3 ML: 10 INJECTION, SOLUTION INFILTRATION; PERINEURAL at 14:34

## 2025-06-09 RX ADMIN — TRIAMCINOLONE ACETONIDE 40 MG: 40 INJECTION, SUSPENSION INTRA-ARTICULAR; INTRAMUSCULAR at 14:34

## 2025-06-09 ASSESSMENT — ENCOUNTER SYMPTOMS
SHORTNESS OF BREATH: 0
WHEEZING: 0
BRUISES/BLEEDS EASILY: 0
NUMBNESS: 0
CHILLS: 0
FEVER: 0
FATIGUE: 0

## 2025-06-09 ASSESSMENT — PAIN - FUNCTIONAL ASSESSMENT: PAIN_FUNCTIONAL_ASSESSMENT: 0-10

## 2025-06-09 ASSESSMENT — PAIN SCALES - GENERAL: PAINLEVEL_OUTOF10: 7

## 2025-06-09 NOTE — PROGRESS NOTES
Reason for Appointment  Chief Complaint   Patient presents with    Right Shoulder - Pain     History of Present Illness  Patient is a 69 y.o. female here today for follow-up evaluation of right shoulder pain, pleasant patient had an AC joint reconstruction years ago doing well last injection into the right subacromial space lasted over 6 months she has classic early rotator cuff arthritic changes not interested in any surgery and not indicated at present pain worse with elevation better with rest difficult to sleep at night difficult to do overhead activities no radicular symptoms no hand weakness full history reviewed.     Medical History[1]    Surgical History[2]    Medication Documentation Review Audit       Reviewed by Kaley Ugarte MA (Medical Assistant) on 25 at 1408      Medication Order Taking? Sig Documenting Provider Last Dose Status   bisoprolol (Zebeta) 5 mg tablet [id number] Yes Take 0.5 tablets (2.5 mg) by mouth once daily. Hayden Ramirez MD  Active   ergocalciferol (Vitamin D-2) 1.25 MG (18426 UT) capsule [id number] Yes Take 1 capsule (50,000 Units) by mouth 2 times a week. Dorina Reid MD Taking Active   furosemide (Lasix) 40 mg tablet [id number]  Take 1 tablet (40 mg) by mouth 2 times daily (morning and late afternoon). Hayden Ramirez MD   24 2359   ibuprofen 200 mg tablet [id number] Yes Take 3 tablets (600 mg) by mouth if needed. Dorina Reid MD Taking Active   lisinopril 5 mg tablet [id number] Yes Take 1 tablet (5 mg) by mouth once daily. Hayden Ramirez MD  Active   ondansetron ODT (Zofran-ODT) 4 mg disintegrating tablet [id number] Yes Dissolve 1 tablet (4 mg) in the mouth every 8 hours if needed for nausea or vomiting. Dorina Reid MD Taking Active   pantoprazole (ProtoNix) 40 mg EC tablet [id number] Yes Take 1 tablet (40 mg) by mouth once daily. Dorina Reid MD Taking Active                    RX Allergies[3]    Review of Systems  "  Constitutional:  Negative for chills, fatigue and fever.   Respiratory:  Negative for shortness of breath and wheezing.    Cardiovascular:  Negative for chest pain and leg swelling.   Allergic/Immunologic: Negative for immunocompromised state.   Neurological:  Negative for numbness.   Hematological:  Does not bruise/bleed easily.       Exam   Exam reveals right shoulder difficult to elevate above the 120 degrees mild weakness in external rotation but deltoid is functional previous well-healed incisions.  Good pulses and sensation at right upper extremity  Assessment   Right shoulder early rotator cuff arthritis    Plan     We performed a subacromial injection today.  Understands a small risk of infection and signs look for.  We went limit the injections long-term but we talked about repeat injection versus surgery such as reverse shoulder replacement long-termPatient ID: Zohreh Davis \"Bryan" is a 69 y.o. female.    L Inj/Asp: R subacromial bursa on 6/9/2025 2:34 PM  Indications: pain  Details: 25 G needle, ultrasound-guided  Medications: 40 mg triamcinolone acetonide 40 mg/mL; 3 mL lidocaine 10 mg/mL (1 %)  Outcome: tolerated well, no immediate complications    After discussing the risks and benefits of the procedure with proceeded with an injection.  Using ultrasound guidance we identified the acromion, humeral head and the subacromial bursa, images obtained and saved. We then sterilely injected the right subacrominal space with a mixture of 40 mg of Kenalog and 1 cc of 1 % lidocaine. Pt tolerated the procedure well without any adverse reactions.   Procedure, treatment alternatives, risks and benefits explained, specific risks discussed. Consent was given by the patient. Immediately prior to procedure a time out was called to verify the correct patient, procedure, equipment, support staff and site/side marked as required. Patient was prepped and draped in the usual sterile fashion.                        "     [1]   Past Medical History:  Diagnosis Date    Other conditions influencing health status     A Fall    Other conditions influencing health status     Urinary Tract Infection    Other conditions influencing health status     Thromboembolic Disease    Other conditions influencing health status     History Of ___ Previous Pregnancies    Personal history of other specified conditions     History of nausea    Personal history of other specified conditions     History of fatigue   [2]   Past Surgical History:  Procedure Laterality Date    CT GUIDED IMAGING FOR NEEDLE PLACEMENT  10/9/2020    CT GUIDED IMAGING FOR NEEDLE PLACEMENT [facility]    GALLBLADDER SURGERY  02/15/2013    Gallbladder Surgery    HYSTERECTOMY  02/15/2013    Hysterectomy    LUMBAR LAMINECTOMY  02/15/2013    Laminectomy Lumbar    MR HEAD ANGIO WO IV CONTRAST  3/3/2018    MR HEAD ANGIO WO IV CONTRAST [facility]    OTHER SURGICAL HISTORY  02/15/2013    Nephrectomy    OTHER SURGICAL HISTORY  10/03/2013    Nerve Block Transforaminal Epidural Lumbar    TOTAL KNEE ARTHROPLASTY  02/15/2013    Knee Replacement   [3]   Allergies  Allergen Reactions    Ketorolac Anaphylaxis and Unknown     Toradol    Nsaids (Non-Steroidal Anti-Inflammatory Drug) Anaphylaxis, GI Upset and Unknown     Pt sts she can take ibuprofen but cannot take celebrex or aleve    Rofecoxib Nausea Only and Unknown     Vioxx    Flurbiprofen Unknown

## 2025-07-21 ENCOUNTER — TELEPHONE (OUTPATIENT)
Dept: CARDIOLOGY | Facility: CLINIC | Age: 70
End: 2025-07-21
Payer: COMMERCIAL

## 2025-07-21 NOTE — TELEPHONE ENCOUNTER
Call placed to Zohreh. No answer. Unable to leave message due to no mailbox set up. Will try again at later time.

## 2025-07-21 NOTE — TELEPHONE ENCOUNTER
Patient reporting edema in left left only, she is only taking Lasix 40 mg po every day because she is concerned about potassium, she is elevating her leg.

## 2025-08-06 ENCOUNTER — APPOINTMENT (OUTPATIENT)
Facility: CLINIC | Age: 70
End: 2025-08-06
Payer: COMMERCIAL

## 2025-08-14 ENCOUNTER — APPOINTMENT (OUTPATIENT)
Dept: CARDIOLOGY | Facility: CLINIC | Age: 70
End: 2025-08-14
Payer: COMMERCIAL

## 2025-08-19 ENCOUNTER — APPOINTMENT (OUTPATIENT)
Dept: CARDIOLOGY | Facility: CLINIC | Age: 70
End: 2025-08-19
Payer: COMMERCIAL

## 2025-09-02 ENCOUNTER — APPOINTMENT (OUTPATIENT)
Dept: CARDIOLOGY | Facility: CLINIC | Age: 70
End: 2025-09-02
Payer: COMMERCIAL

## 2025-09-02 ASSESSMENT — LIFESTYLE VARIABLES
HOW MANY STANDARD DRINKS CONTAINING ALCOHOL DO YOU HAVE ON A TYPICAL DAY: PATIENT DOES NOT DRINK
HOW OFTEN DO YOU HAVE SIX OR MORE DRINKS ON ONE OCCASION: NEVER
AUDIT-C TOTAL SCORE: 0
HOW OFTEN DO YOU HAVE A DRINK CONTAINING ALCOHOL: NEVER
HAVE YOU OR SOMEONE ELSE BEEN INJURED AS A RESULT OF YOUR DRINKING: NO
AUDIT TOTAL SCORE: 0
HAS A RELATIVE, FRIEND, DOCTOR, OR ANOTHER HEALTH PROFESSIONAL EXPRESSED CONCERN ABOUT YOUR DRINKING OR SUGGESTED YOU CUT DOWN: NO
SKIP TO QUESTIONS 9-10: 1

## 2025-09-02 ASSESSMENT — ENCOUNTER SYMPTOMS
DEPRESSION: 0
LOSS OF SENSATION IN FEET: 0
OCCASIONAL FEELINGS OF UNSTEADINESS: 0

## 2025-09-02 ASSESSMENT — PATIENT HEALTH QUESTIONNAIRE - PHQ9
2. FEELING DOWN, DEPRESSED OR HOPELESS: NOT AT ALL
SUM OF ALL RESPONSES TO PHQ9 QUESTIONS 1 AND 2: 0
1. LITTLE INTEREST OR PLEASURE IN DOING THINGS: NOT AT ALL

## 2025-09-02 ASSESSMENT — PAIN SCALES - GENERAL: PAINLEVEL_OUTOF10: 0-NO PAIN

## 2025-12-01 ENCOUNTER — APPOINTMENT (OUTPATIENT)
Dept: CARDIOLOGY | Facility: CLINIC | Age: 70
End: 2025-12-01
Payer: COMMERCIAL